# Patient Record
Sex: MALE | URBAN - METROPOLITAN AREA
[De-identification: names, ages, dates, MRNs, and addresses within clinical notes are randomized per-mention and may not be internally consistent; named-entity substitution may affect disease eponyms.]

---

## 2022-01-01 ENCOUNTER — HOSPITAL ENCOUNTER (EMERGENCY)
Facility: HOSPITAL | Age: 32
End: 2022-01-01
Attending: STUDENT IN AN ORGANIZED HEALTH CARE EDUCATION/TRAINING PROGRAM

## 2022-01-01 DIAGNOSIS — S11.93XA PENETRATING TRAUMATIC INJURY OF NECK: ICD-10-CM

## 2022-01-01 DIAGNOSIS — I46.9 CARDIOPULMONARY ARREST (HCC): Primary | ICD-10-CM

## 2022-01-01 LAB — SARS-COV-2 RNA RESP QL NAA+PROBE: NOT DETECTED

## 2022-01-01 PROCEDURE — 99203 OFFICE O/P NEW LOW 30 MIN: CPT | Performed by: SURGERY

## 2022-02-14 NOTE — ED QUICK NOTES
Dr. Ondina Gonzalez at bedside with [address] [facility],[address], to verify any cardiac activity.
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
## 2022-02-14 NOTE — ED QUICK NOTES
Deep lac to right supra clavicular. No breath sounds, no pulse, no cardiac movement. Blood pooling to right chest wound.
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
